# Patient Record
Sex: FEMALE | Race: OTHER
[De-identification: names, ages, dates, MRNs, and addresses within clinical notes are randomized per-mention and may not be internally consistent; named-entity substitution may affect disease eponyms.]

---

## 2018-03-26 ENCOUNTER — HOSPITAL ENCOUNTER (OUTPATIENT)
Dept: HOSPITAL 62 - END | Age: 73
Discharge: HOME | End: 2018-03-26
Attending: INTERNAL MEDICINE
Payer: MEDICARE

## 2018-03-26 VITALS — SYSTOLIC BLOOD PRESSURE: 156 MMHG | DIASTOLIC BLOOD PRESSURE: 77 MMHG

## 2018-03-26 DIAGNOSIS — K64.8: ICD-10-CM

## 2018-03-26 DIAGNOSIS — D12.5: ICD-10-CM

## 2018-03-26 DIAGNOSIS — Z79.899: ICD-10-CM

## 2018-03-26 DIAGNOSIS — Z86.010: ICD-10-CM

## 2018-03-26 DIAGNOSIS — K44.9: ICD-10-CM

## 2018-03-26 DIAGNOSIS — E78.00: ICD-10-CM

## 2018-03-26 DIAGNOSIS — K29.80: ICD-10-CM

## 2018-03-26 DIAGNOSIS — Z12.11: Primary | ICD-10-CM

## 2018-03-26 DIAGNOSIS — Z85.3: ICD-10-CM

## 2018-03-26 DIAGNOSIS — I10: ICD-10-CM

## 2018-03-26 DIAGNOSIS — D64.9: ICD-10-CM

## 2018-03-26 DIAGNOSIS — K52.9: ICD-10-CM

## 2018-03-26 DIAGNOSIS — K29.50: ICD-10-CM

## 2018-03-26 PROCEDURE — 88305 TISSUE EXAM BY PATHOLOGIST: CPT

## 2018-03-26 PROCEDURE — 45385 COLONOSCOPY W/LESION REMOVAL: CPT

## 2018-03-26 PROCEDURE — 88342 IMHCHEM/IMCYTCHM 1ST ANTB: CPT

## 2018-03-26 PROCEDURE — 45380 COLONOSCOPY AND BIOPSY: CPT

## 2018-03-26 PROCEDURE — 43239 EGD BIOPSY SINGLE/MULTIPLE: CPT

## 2018-03-26 NOTE — OPERATIVE REPORT
Operative Report


DATE OF SURGERY: 03/26/18


Operative Report: 





The risks, benefits and alternatives of the procedure including risks of 

bleeding, perforation requiring surgery are explained to the patient in detail 

and informed consent is obtained.  Patient is brought back to the endoscopy 

suite and placed in the left, lateral decubital position.  Timeout was called.  

Propofol medications administered.  A rectal examination is done which did not 

reveal any masses, tears or fissures.  An Olympus videoscope was inserted into 

the patient's rectum.  The scope was then carefully advanced all the way to the 

cecum.  The cecum is identified by the usual anatomical landmarks including the 

ileocecal valve as well as the appendiceal office.  Photodocumentation is 

obtained.  The scope was then sequentially pulled back via the various segments 

of the colon including the ascending colon, hepatic flexure, transverse colon, 

splenic flexure, descending colon and finally into the rectosigmoid portions of 

the colon.  Prep is fair.  Retroflexion maneuvers performed.


The risks benefits and alternatives of the procedure explained to the patient 

in detail and informed consent is obtained .A GIF Olympus video scope was 

inserted into the patient's mouth and hypopharynx ,the esophagus is identified 

intubated and insufflated ,the scope was then advanced through the esophagus 

stomach and duodenum, retroflexion maneuver is done, the esophagus stomach and 

first and second portions of the duodenum examined


PREOPERATIVE DIAGNOSIS: Anemia rule out GI bleed.  Personal history of polyp


POSTOPERATIVE DIAGNOSIS: Mild right-sided inflammation status post biopsy.  

Sigmoid polyp status post snare polypectomy with removal.  Internal 

hemorrhoids.  Hiatal hernia.  Gastritis status post biopsy rule out 

Helicobacter pylori.  Duodenitis


OPERATION: Colonoscopy with snare polypectomy.  Colonoscopy with biopsy.  EGD 

with biopsy


SURGEON: MARY JO BECKER


ANESTHESIA: LMAC


TISSUE REMOVED OR ALTERED: As noted above.


COMPLICATIONS: 





None.


ESTIMATED BLOOD LOSS: None.


INTRAOPERATIVE FINDINGS: As noted above.


PROCEDURE: 





Patient tolerated procedure well.


No immediate postprocedure complications are noted.


Patient discharged in good condition.


Discharge date 3/26/2018.


Discharge diet: Regular.


Discharge activity: Regular.


2-3 week follow-up to discuss findings.


Patient is instructed call the office or proceed to the emergency room should 

there be any further problems or questions.


We will await pathology.


3-5 year surveillance colonoscopy.

## 2018-08-07 ENCOUNTER — HOSPITAL ENCOUNTER (OUTPATIENT)
Dept: HOSPITAL 62 - END | Age: 73
Discharge: HOME | End: 2018-08-07
Attending: INTERNAL MEDICINE
Payer: MEDICARE

## 2018-08-07 VITALS — SYSTOLIC BLOOD PRESSURE: 138 MMHG | DIASTOLIC BLOOD PRESSURE: 69 MMHG

## 2018-08-07 DIAGNOSIS — K29.50: ICD-10-CM

## 2018-08-07 DIAGNOSIS — E78.00: ICD-10-CM

## 2018-08-07 DIAGNOSIS — K64.8: ICD-10-CM

## 2018-08-07 DIAGNOSIS — D64.9: ICD-10-CM

## 2018-08-07 DIAGNOSIS — Z86.010: ICD-10-CM

## 2018-08-07 DIAGNOSIS — K44.9: Primary | ICD-10-CM

## 2018-08-07 DIAGNOSIS — Z79.899: ICD-10-CM

## 2018-08-07 DIAGNOSIS — I10: ICD-10-CM

## 2018-08-07 PROCEDURE — 88305 TISSUE EXAM BY PATHOLOGIST: CPT

## 2018-08-07 PROCEDURE — 43239 EGD BIOPSY SINGLE/MULTIPLE: CPT

## 2018-08-07 PROCEDURE — 88342 IMHCHEM/IMCYTCHM 1ST ANTB: CPT

## 2018-08-07 NOTE — OPERATIVE REPORT
Operative Report


DATE OF SURGERY: 08/07/18


Operative Report: 





The risks benefits and alternatives of the procedure explained to the patient 

in detail and informed consent is obtained.A GIF Olympus video scope was 

inserted into the patient's mouth and hypopharynx, the esophagus is identified 

intubated and insufflated, the scope was then advanced through the esophagus 

stomach and duodenum, retroflexion maneuver is done, the esophagus stomach and 

first and second portions of the duodenum examined


PREOPERATIVE DIAGNOSIS: Follow-up gastritis


POSTOPERATIVE DIAGNOSIS: Market improvement biopsy obtained to rule out 

Helicobacter pylori.  No further bleeding.  No ulcers.  Hiatal hernia


OPERATION: EGD with biopsy


SURGEON: MARY JO BECKER


ANESTHESIA: Moderate Sedation - 2 mg of Versed, 50 mcg of fentanyl.  Conscious 

sedation monitoring time 30 minutes.


TISSUE REMOVED OR ALTERED: As noted above.


COMPLICATIONS: 





None.


ESTIMATED BLOOD LOSS: None.


INTRAOPERATIVE FINDINGS: As noted above.


PROCEDURE: 





Patient tolerated the procedure well.


No immediate postprocedure comp occasions are noted.


Patient discharged in good condition.


Discharge date 8/7/2018.


Discharge diet: Regular.


Discharge activity: Regular.


2-3 week follow-up to discuss findings.


Patient is instructed call the office or proceed to the emergency room should 

there be any further problems or questions.


We will wait on the pathology.